# Patient Record
Sex: FEMALE | Race: WHITE | Employment: PART TIME | URBAN - NONMETROPOLITAN AREA
[De-identification: names, ages, dates, MRNs, and addresses within clinical notes are randomized per-mention and may not be internally consistent; named-entity substitution may affect disease eponyms.]

---
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
## 2024-04-10 ENCOUNTER — OFFICE VISIT (OUTPATIENT)
Dept: OBSTETRICS AND GYNECOLOGY | Age: 24
End: 2024-04-10
Payer: COMMERCIAL

## 2024-04-10 VITALS
SYSTOLIC BLOOD PRESSURE: 102 MMHG | BODY MASS INDEX: 25.44 KG/M2 | WEIGHT: 149 LBS | DIASTOLIC BLOOD PRESSURE: 70 MMHG | HEIGHT: 64 IN

## 2024-04-10 DIAGNOSIS — Z01.419 WELL WOMAN EXAM WITH ROUTINE GYNECOLOGICAL EXAM: Primary | ICD-10-CM

## 2024-04-10 DIAGNOSIS — Z31.9 PATIENT DESIRES PREGNANCY: ICD-10-CM

## 2024-04-10 DIAGNOSIS — Z12.4 ENCOUNTER FOR SCREENING FOR CERVICAL CANCER: ICD-10-CM

## 2024-04-10 NOTE — PROGRESS NOTES
"Subjective     Rambo Vang is a 23 y.o. female    History of Present Illness  The patient presents today as a new patient to our office. She was previously seen by Dr. Benites. The patient's last pap smear was completed on 3/30/2023 ASCUS HPV negative. She desires pregnancy and denies any GYN problems or concerns.   Gynecologic Exam  The patient's pertinent negatives include no genital itching, genital lesions, genital odor, genital rash, missed menses, pelvic pain, vaginal bleeding or vaginal discharge. The patient is experiencing no pain. Pertinent negatives include no abdominal pain, anorexia, back pain, chills, constipation, diarrhea, discolored urine, dysuria, fever, flank pain, frequency, headaches, hematuria, joint pain, joint swelling, nausea, painful intercourse, rash, sore throat, urgency or vomiting. She is sexually active. No, her partner does not have an STD. She uses nothing for contraception. Her menstrual history has been regular. The maximum temperature recorded prior to her arrival was no fever.         /70 (BP Location: Left arm, Patient Position: Sitting, Cuff Size: Adult)   Ht 162.6 cm (64\")   Wt 67.6 kg (149 lb)   LMP 04/08/2024   BMI 25.58 kg/m²     No outpatient encounter medications on file as of 4/10/2024.     No facility-administered encounter medications on file as of 4/10/2024.       Past Medical History  History reviewed. No pertinent past medical history.    Surgical History  History reviewed. No pertinent surgical history.    Family History  Family History   Problem Relation Age of Onset    Breast cancer Neg Hx     Ovarian cancer Neg Hx     Uterine cancer Neg Hx     Colon cancer Neg Hx     Melanoma Neg Hx        The following portions of the patient's history were reviewed and updated as appropriate: allergies, current medications, past family history, past medical history, past social history, past surgical history, and problem list.    Review of Systems "   Constitutional: Negative.  Negative for chills and fever.   HENT: Negative.  Negative for sore throat.    Eyes: Negative.    Respiratory: Negative.     Cardiovascular: Negative.    Gastrointestinal: Negative.  Negative for abdominal pain, anorexia, constipation, diarrhea, nausea and vomiting.   Endocrine: Negative.    Genitourinary: Negative.  Negative for dysuria, flank pain, frequency, hematuria, missed menses, pelvic pain, urgency and vaginal discharge.   Musculoskeletal: Negative.  Negative for back pain and joint pain.   Skin: Negative.  Negative for rash.   Allergic/Immunologic: Negative.    Neurological: Negative.    Hematological: Negative.    Psychiatric/Behavioral: Negative.         Objective   Physical Exam  Vitals and nursing note reviewed. Exam conducted with a chaperone present.   Constitutional:       General: She is not in acute distress.     Appearance: She is well-developed. She is not diaphoretic.   HENT:      Head: Normocephalic.      Right Ear: External ear normal.      Left Ear: External ear normal.      Nose: Nose normal.   Eyes:      General: No scleral icterus.        Right eye: No discharge.         Left eye: No discharge.      Conjunctiva/sclera: Conjunctivae normal.      Pupils: Pupils are equal, round, and reactive to light.   Neck:      Thyroid: No thyromegaly.      Vascular: No carotid bruit.      Trachea: No tracheal deviation.   Cardiovascular:      Rate and Rhythm: Normal rate and regular rhythm.      Pulses: Normal pulses.      Heart sounds: Normal heart sounds. No murmur heard.  Pulmonary:      Effort: Pulmonary effort is normal. No respiratory distress.      Breath sounds: Normal breath sounds. No wheezing.   Chest:   Breasts:     Breasts are symmetrical.      Right: Normal. No swelling, bleeding, inverted nipple, mass, nipple discharge, skin change or tenderness.      Left: Normal. No swelling, bleeding, inverted nipple, mass, nipple discharge, skin change or tenderness.    Abdominal:      General: Abdomen is flat. Bowel sounds are normal. There is no distension.      Palpations: Abdomen is soft. There is no mass.      Tenderness: There is no abdominal tenderness. There is no right CVA tenderness, left CVA tenderness or guarding.      Hernia: No hernia is present. There is no hernia in the left inguinal area or right inguinal area.   Genitourinary:     General: Normal vulva.      Exam position: Lithotomy position.      Labia:         Right: No rash, tenderness, lesion or injury.         Left: No rash, tenderness, lesion or injury.       Vagina: Normal. No signs of injury and foreign body. No vaginal discharge, erythema, tenderness or bleeding.      Cervix: Normal.      Uterus: Normal. Not enlarged, not fixed and not tender.       Adnexa: Right adnexa normal and left adnexa normal.        Right: No mass, tenderness or fullness.          Left: No mass, tenderness or fullness.        Rectum: Normal. No mass.      Comments: BSU normal  Urethral meatus  Normal  Perineum  Normal    Patient currently on her cycle at this time with moderate bleeding.  Musculoskeletal:         General: No tenderness. Normal range of motion.      Cervical back: Normal range of motion and neck supple.   Lymphadenopathy:      Head:      Right side of head: No submental, submandibular, tonsillar, preauricular, posterior auricular or occipital adenopathy.      Left side of head: No submental, submandibular, tonsillar, preauricular, posterior auricular or occipital adenopathy.      Cervical: No cervical adenopathy.      Right cervical: No superficial, deep or posterior cervical adenopathy.     Left cervical: No superficial, deep or posterior cervical adenopathy.      Upper Body:      Right upper body: No supraclavicular, axillary or pectoral adenopathy.      Left upper body: No supraclavicular, axillary or pectoral adenopathy.      Lower Body: No right inguinal adenopathy. No left inguinal adenopathy.   Skin:      General: Skin is warm and dry.      Findings: No bruising, erythema or rash.   Neurological:      Mental Status: She is alert and oriented to person, place, and time.      Coordination: Coordination normal.   Psychiatric:         Mood and Affect: Mood normal.         Behavior: Behavior normal.         Thought Content: Thought content normal.         Judgment: Judgment normal.       PHQ-9 Depression Screening  Little interest or pleasure in doing things? 0-->not at all   Feeling down, depressed, or hopeless? 0-->not at all   Trouble falling or staying asleep, or sleeping too much?     Feeling tired or having little energy?     Poor appetite or overeating?     Feeling bad about yourself - or that you are a failure or have let yourself or your family down?     Trouble concentrating on things, such as reading the newspaper or watching television?     Moving or speaking so slowly that other people could have noticed? Or the opposite - being so fidgety or restless that you have been moving around a lot more than usual?     Thoughts that you would be better off dead, or of hurting yourself in some way?     PHQ-9 Total Score 0   If you checked off any problems, how difficult have these problems made it for you to do your work, take care of things at home, or get along with other people?          Assessment & Plan   Diagnoses and all orders for this visit:    1. Well woman exam with routine gynecological exam (Primary)  Comments:  The patient is new to our office as a transfer of care from Dr. Wood. She is established with her pcp and lab work is current. Her last WWE was completed on 3/30/2023 with ASCUS pap. Her lmp was today.     2. Encounter for screening for cervical cancer  Comments:  The patient is sexually active and declines std screening today. Patient's last pap smear was completed on 3/30/2023 ASCUS, hpv negative. Pelvic exam with pap smear completed. We did discuss that due to her bleeding, there is a  change that her pap smear may need to be repeated. She v/u of this.   Orders:  -     Liquid-based Pap Smear, Screening    3. Patient desires pregnancy  Discussed with the patient to begin taking pnv and time intercourse with ovulation. She states she is already doing these things to improve chances for pregnancy.       Normal GYN exam. Encouraged SBE, pt is aware how to do self breast exam and the importance of same. Pap smear is done per ASCCP guidelines. HPV is not done. Lab work up is up to date.      BMI is >= 25 and <30. (Overweight) The following options were offered after discussion;: information on healthy weight added to patient's after visit summary       Mary Morris, RADHAMES  4/10/2024

## 2024-04-15 LAB
GEN CATEG CVX/VAG CYTO-IMP: NORMAL
LAB AP CASE REPORT: NORMAL
LAB AP GYN ADDITIONAL INFORMATION: NORMAL
LAB AP GYN OTHER FINDINGS: NORMAL
Lab: NORMAL
PATH INTERP SPEC-IMP: NORMAL
STAT OF ADQ CVX/VAG CYTO-IMP: NORMAL

## 2025-05-05 ENCOUNTER — OFFICE VISIT (OUTPATIENT)
Age: 25
End: 2025-05-05
Payer: COMMERCIAL

## 2025-05-05 VITALS
DIASTOLIC BLOOD PRESSURE: 81 MMHG | SYSTOLIC BLOOD PRESSURE: 117 MMHG | BODY MASS INDEX: 23.39 KG/M2 | WEIGHT: 137 LBS | HEIGHT: 64 IN

## 2025-05-05 DIAGNOSIS — Z30.9 ENCOUNTER FOR CONTRACEPTIVE MANAGEMENT, UNSPECIFIED TYPE: ICD-10-CM

## 2025-05-05 DIAGNOSIS — Z72.51 UNPROTECTED SEXUAL INTERCOURSE: ICD-10-CM

## 2025-05-05 DIAGNOSIS — Z01.419 WELL WOMAN EXAM WITH ROUTINE GYNECOLOGICAL EXAM: Primary | ICD-10-CM

## 2025-05-05 LAB
B-HCG UR QL: NEGATIVE
EXPIRATION DATE: NORMAL
INTERNAL NEGATIVE CONTROL: NEGATIVE
INTERNAL POSITIVE CONTROL: POSITIVE
Lab: NORMAL

## 2025-05-05 RX ORDER — DESOGESTREL AND ETHINYL ESTRADIOL 0.15-0.03
1 KIT ORAL DAILY
Qty: 90 TABLET | Refills: 0 | Status: SHIPPED | OUTPATIENT
Start: 2025-05-05 | End: 2026-05-05

## 2025-05-05 NOTE — PROGRESS NOTES
"Subjective     Rambo Vang is a 25 y.o. female    History of Present Illness  The patient presents to [facility] office today for completion of her WWE. She is not currently on any form of contraception. She did have recent unprotected intercourse during her ovulation timeframe. She is requesting upt and hcg quant for pregnancy screening. She is also interested in discussing contraception.  Gynecologic Exam  The patient's pertinent negatives include no genital itching, genital lesions, genital odor, genital rash, missed menses, pelvic pain, vaginal bleeding or vaginal discharge. The patient is experiencing no pain. Pertinent negatives include no abdominal pain, anorexia, back pain, chills, constipation, diarrhea, discolored urine, dysuria, fever, flank pain, frequency, headaches, hematuria, joint pain, joint swelling, nausea, painful intercourse, rash, sore throat, urgency or vomiting. She is sexually active. No, her partner does not have an STD. She uses nothing for contraception. Her menstrual history has been regular. The maximum temperature recorded prior to her arrival was no fever.         /81   Ht 162.6 cm (64\")   Wt 62.1 kg (137 lb)   LMP 04/16/2025 (Exact Date)   BMI 23.52 kg/m²     Outpatient Encounter Medications as of 5/5/2025   Medication Sig Dispense Refill    desogestrel-ethinyl estradiol (APRI) 0.15-30 MG-MCG per tablet Take 1 tablet by mouth Daily. 90 tablet 0     No facility-administered encounter medications on file as of 5/5/2025.       Past Medical History  History reviewed. No pertinent past medical history.    Surgical History  History reviewed. No pertinent surgical history.    Family History  Family History   Problem Relation Age of Onset    Breast cancer Neg Hx     Ovarian cancer Neg Hx     Uterine cancer Neg Hx     Colon cancer Neg Hx     Melanoma Neg Hx        The following portions of the patient's history were reviewed and updated as appropriate: allergies, current " medications, past family history, past medical history, past social history, past surgical history, and problem list.    Review of Systems   Constitutional: Negative.  Negative for chills and fever.   HENT: Negative.  Negative for sore throat.    Eyes: Negative.    Respiratory: Negative.     Cardiovascular: Negative.    Gastrointestinal: Negative.  Negative for abdominal pain, anorexia, constipation, diarrhea, nausea and vomiting.   Endocrine: Negative.    Genitourinary: Negative.  Negative for breast discharge, breast lump, breast pain, dysuria, flank pain, frequency, hematuria, missed menses, pelvic pain, urgency and vaginal discharge.   Musculoskeletal: Negative.  Negative for back pain and joint pain.   Skin: Negative.  Negative for rash.   Allergic/Immunologic: Negative.    Neurological: Negative.    Hematological: Negative.    Psychiatric/Behavioral: Negative.         Objective   Physical Exam  Vitals and nursing note reviewed. Exam conducted with a chaperone present.   Constitutional:       General: She is not in acute distress.     Appearance: She is well-developed. She is not diaphoretic.   HENT:      Head: Normocephalic.      Right Ear: External ear normal.      Left Ear: External ear normal.      Nose: Nose normal.   Eyes:      General: No scleral icterus.        Right eye: No discharge.         Left eye: No discharge.      Conjunctiva/sclera: Conjunctivae normal.      Pupils: Pupils are equal, round, and reactive to light.   Neck:      Thyroid: No thyromegaly.      Vascular: No carotid bruit.      Trachea: No tracheal deviation.   Cardiovascular:      Rate and Rhythm: Normal rate and regular rhythm.      Pulses: Normal pulses.      Heart sounds: Normal heart sounds. No murmur heard.  Pulmonary:      Effort: Pulmonary effort is normal. No respiratory distress.      Breath sounds: Normal breath sounds. No wheezing.   Chest:   Breasts:     Breasts are symmetrical.      Right: Normal. No swelling, bleeding,  inverted nipple, mass, nipple discharge, skin change or tenderness.      Left: Normal. No swelling, bleeding, inverted nipple, mass, nipple discharge, skin change or tenderness.   Abdominal:      General: Bowel sounds are normal. There is no distension.      Palpations: Abdomen is soft. There is no mass.      Tenderness: There is no abdominal tenderness. There is no right CVA tenderness, left CVA tenderness or guarding.      Hernia: No hernia is present. There is no hernia in the left inguinal area or right inguinal area.   Genitourinary:     General: Normal vulva.      Exam position: Lithotomy position.      Labia:         Right: No rash, tenderness, lesion or injury.         Left: No rash, tenderness, lesion or injury.       Vagina: Normal. No signs of injury and foreign body. No vaginal discharge, erythema, tenderness or bleeding.      Cervix: Normal.      Uterus: Normal. Not enlarged, not fixed and not tender.       Adnexa: Right adnexa normal and left adnexa normal.        Right: No mass, tenderness or fullness.          Left: No mass, tenderness or fullness.        Rectum: Normal. No mass.      Comments:   BSU normal  Urethral meatus  Normal  Perineum  Normal  Musculoskeletal:         General: No tenderness. Normal range of motion.      Cervical back: Normal range of motion and neck supple.   Lymphadenopathy:      Head:      Right side of head: No submental, submandibular, tonsillar, preauricular, posterior auricular or occipital adenopathy.      Left side of head: No submental, submandibular, tonsillar, preauricular, posterior auricular or occipital adenopathy.      Cervical: No cervical adenopathy.      Right cervical: No superficial, deep or posterior cervical adenopathy.     Left cervical: No superficial, deep or posterior cervical adenopathy.      Upper Body:      Right upper body: No supraclavicular, axillary or pectoral adenopathy.      Left upper body: No supraclavicular, axillary or pectoral  adenopathy.      Lower Body: No right inguinal adenopathy. No left inguinal adenopathy.   Skin:     General: Skin is warm and dry.      Findings: No bruising, erythema or rash.   Neurological:      Mental Status: She is alert and oriented to person, place, and time.      Coordination: Coordination normal.   Psychiatric:         Mood and Affect: Mood normal.         Behavior: Behavior normal.         Thought Content: Thought content normal.         Judgment: Judgment normal.       PHQ-9 Depression Screening  Little interest or pleasure in doing things? Not at all   Feeling down, depressed, or hopeless? Not at all   PHQ-2 Total Score 0   Trouble falling or staying asleep, or sleeping too much?     Feeling tired or having little energy?     Poor appetite or overeating?     Feeling bad about yourself - or that you are a failure or have let yourself or your family down?     Trouble concentrating on things, such as reading the newspaper or watching television?     Moving or speaking so slowly that other people could have noticed? Or the opposite - being so fidgety or restless that you have been moving around a lot more than usual?     Thoughts that you would be better off dead, or of hurting yourself in some way?     PHQ-9 Total Score     If you checked off any problems, how difficult have these problems made it for you to do your work, take care of things at home, or get along with other people?          Assessment & Plan   Diagnoses and all orders for this visit:    1. Well woman exam with routine gynecological exam (Primary)  Comments:  The patient is new to me and presents to the office for her WWE. She is established with her pcp and screening labs are current. LMP noted 4/16/2025.    2. Unprotected sexual intercourse  Comments:  Patient reports within the past couple of weeks she had unprotected intercourse during ovulation. LMP 4/16/2025. UPT negative in office today. She is requesting hcg quant. Patient will be  contacted with results.   Orders:  -     HCG, B-subunit, Quantitative ([facility] Only); Future    3. Encounter for contraceptive management, unspecified type  Comments:  Contraception options discussed. Negative UPT in office today. HCG quant pending. If negative, will start Apri and will follow up in 3 months.  Orders:  -     POC Pregnancy, Urine  -     desogestrel-ethinyl estradiol (APRI) 0.15-30 MG-MCG per tablet; Take 1 tablet by mouth Daily.  Dispense: 90 tablet; Refill: 0         Normal GYN exam. Encouraged SBE, pt is aware how to do self breast exam and the importance of same. Discussed weight management and importance of maintaining a healthy weight. Discussed Vitamin D intake and the importance of adequate vitamin D for both bone health and a healthy immune system.  Discussed daily exercise and the importance of same, in regards to a healthy heart as well as helping to maintain her weight and improving her mental health.  Colonoscopy is not indicated. Mammogram is not indicated. Bone density is not indicated. Pap smear is done per ASCCP guidelines. HPV is not done. Lab work up is up to date.      BMI is within normal parameters. No other follow-up for BMI required.      Mary Morris, RADHAMES  5/5/2025

## 2025-05-06 LAB — HCG INTACT+B SERPL-ACNC: <1 MIU/ML

## 2025-06-13 ENCOUNTER — INITIAL PRENATAL (OUTPATIENT)
Age: 25
End: 2025-06-13
Payer: COMMERCIAL

## 2025-06-13 VITALS — BODY MASS INDEX: 23.57 KG/M2 | WEIGHT: 137.3 LBS | SYSTOLIC BLOOD PRESSURE: 118 MMHG | DIASTOLIC BLOOD PRESSURE: 64 MMHG

## 2025-06-13 DIAGNOSIS — Z36.3 SCREENING, ANTENATAL, FOR MALFORMATION BY ULTRASOUND: ICD-10-CM

## 2025-06-13 DIAGNOSIS — O36.80X0 ENCOUNTER TO DETERMINE FETAL VIABILITY OF PREGNANCY, SINGLE OR UNSPECIFIED FETUS: ICD-10-CM

## 2025-06-13 DIAGNOSIS — R11.0 PREGNANCY RELATED NAUSEA, ANTEPARTUM: ICD-10-CM

## 2025-06-13 DIAGNOSIS — O26.899 PREGNANCY RELATED NAUSEA, ANTEPARTUM: ICD-10-CM

## 2025-06-13 DIAGNOSIS — Z3A.01 7 WEEKS GESTATION OF PREGNANCY: ICD-10-CM

## 2025-06-13 DIAGNOSIS — Z34.01 PRIMIGRAVIDA, FIRST TRIMESTER: Primary | ICD-10-CM

## 2025-06-13 RX ORDER — PRENATAL VIT NO.126/IRON/FOLIC 28MG-0.8MG
TABLET ORAL DAILY
COMMUNITY

## 2025-06-13 NOTE — PROGRESS NOTES
Arrived to initiate prenatal care     . Patient's last menstrual period was 2025 (exact date). Pre-pregnancy weight of 137 pounds.     History of Present Illness  The patient is a 25-year-old female who presents for an initial prenatal visit at 7 weeks gestation.    She reports experiencing nausea but has not yet sought any treatment for it. She has not experienced any episodes of vomiting. This is her first pregnancy, which was unplanned. She reports no vaginal bleeding or pelvic discomfort.        Prenatal history significant for: Primigravida     History significant for: adolescent scoliosis, NKA     Primary care provider: Ashkan Coe MD    The following portion of the patient's history were reviewed and updated as needed: allergies, current medications, past family history, past medical history, social history, surgical history, and problem list.    ROS: All systems reviewed and are negative with exception as noted above.       Ultrasound today: Intrauterine gestation with fetal heart rate of 142 bpm. Estimated gestational age 7w3d by CRL measurement 12.2 mm.      Cervical Cancer Screenin/10/2024 NILM    Exam:  /64   Wt 62.3 kg (137 lb 4.8 oz)   LMP 2025 (Exact Date)   BMI 23.57 kg/m²   Total weight gain: 0.136 kg (4.8 oz)    Prenatal Assessment  Fetal Heart Rate: 142  Movement: Absent    General Appearance:  healthy-appearing . Appropriate mood and behavior.  HEENT:  Neck supple, no thyroidmegaly.  Cardiorespiratory: HR str and reg. No murmur. Lungs clear. Resp even and unlabored.  Abd: Soft, nontender. No CVA tenderness.   Ext: Calves non-tender. No cyanosis or edema.    Postpartum Depression: Low Risk  (2025)    [place]  Depression Scale     Last EPDS Total Score: 0     Last EPDS Self Harm Result: Never         Diagnoses and all orders for this visit:    1. Primigravida, first trimester (Primary)  -     ToxASSURE Select 13 (MW) - Urine, Clean  Catch  -     Chlamydia trachomatis, Neisseria gonorrhoeae, Trichomonas vaginalis, PCR - Urine, Urine, Clean Catch  -     Urine Culture - Urine, Urine, Clean Catch  -     Varicella Zoster Antibody, IgG  -     Rubella Antibody, IgG  -     Hepatitis B Surface Antigen  -     RPR, Rfx Qn RPR / Confirm TP  -     HCV Antibody Rfx To Qnt PCR  -     HIV-1 / O / 2 Ag / Antibody  -     Hemoglobin A1c  -     TSH Rfx On Abnormal To Free T4  -     CBC & Differential  -     Antibody Screen  -     ABO / Rh  -     Ambulatory Referral to [facility]/Perinatology    2. 7 weeks gestation of pregnancy    3. Screening, , for malformation by ultrasound  -     Ambulatory Referral to [facility]/Perinatology    4. Encounter to determine fetal viability of pregnancy, single or unspecified fetus    5. Pregnancy related nausea, antepartum    Assessment & Plan  1. Initial prenatal visit at 7 weeks gestation.  The ultrasound today reviewed and shows a gestational age of approximately 7 weeks and 3 days, with a fetal heart rate of 142 beats per minute. Last menstrual period was on 2025. Pap smear is up-to-date and normal. Depression screening today considered low risk with score of 0.     - Encouraged daily self-care for physical, mental, and emotional well-being.   - Advised to consume stanley, Preggie Pops, or stanley tea to help alleviate nausea. Recommended over-the-counter Unisom and vitamin B6. Instructed to avoid having an empty stomach.  - Initial prenatal labs today.   - Referral to Maternal-Fetal Medicine ([facility]) made for an anatomy scan at 20 weeks.  - Encouraged to contact the office if experiencing any bleeding or pain.  - Discussed importance of dental health during pregnancy.   - Educated to remain active and exercise.  - Ob routine reviewed.   - Can send a message via [employer] or call the office if there are any questions or concerns.  - Dietary recommendations include avoiding unsafe seafood, undercooked meats, unpasteurized dairy  products, and soft cheeses.     Follow-up  Follow up in 4 weeks while deciding upon obstetrician for care also.    Prenatal folder provided and reviewed. Refer to AVS instructions for additional education provided.         This note has been signed electronically.     Petra Dang DNP, APRN, CNM      Patient or patient representative verbalized consent for the use of Ambient Listening during the visit with  RADHAMES Ross for chart documentation. 6/13/2025  13:48 CDT

## 2025-06-18 LAB
ABO GROUP BLD: NORMAL
BACTERIA UR CULT: NORMAL
BACTERIA UR CULT: NORMAL
BASOPHILS # BLD AUTO: 0.1 X10E3/UL (ref 0–0.2)
BASOPHILS NFR BLD AUTO: 1 %
BLD GP AB SCN SERPL QL: NEGATIVE
C TRACH RRNA SPEC QL NAA+PROBE: NEGATIVE
DRUGS UR: NORMAL
EOSINOPHIL # BLD AUTO: 0 X10E3/UL (ref 0–0.4)
EOSINOPHIL NFR BLD AUTO: 0 %
ERYTHROCYTE [DISTWIDTH] IN BLOOD BY AUTOMATED COUNT: 11.8 % (ref 11.7–15.4)
HBA1C MFR BLD: 5.1 % (ref 4.8–5.6)
HBV SURFACE AG SERPL QL IA: NEGATIVE
HCT VFR BLD AUTO: 40.5 % (ref 34–46.6)
HCV AB SERPL QL IA: NON REACTIVE
HCV AB SERPL QL IA: NORMAL
HGB BLD-MCNC: 13.1 G/DL (ref 11.1–15.9)
HIV 1+2 AB+HIV1 P24 AG SERPL QL IA: NON REACTIVE
IMM GRANULOCYTES # BLD AUTO: 0 X10E3/UL (ref 0–0.1)
IMM GRANULOCYTES NFR BLD AUTO: 0 %
LYMPHOCYTES # BLD AUTO: 1.8 X10E3/UL (ref 0.7–3.1)
LYMPHOCYTES NFR BLD AUTO: 14 %
MCH RBC QN AUTO: 30.2 PG (ref 26.6–33)
MCHC RBC AUTO-ENTMCNC: 32.3 G/DL (ref 31.5–35.7)
MCV RBC AUTO: 93 FL (ref 79–97)
MONOCYTES # BLD AUTO: 0.6 X10E3/UL (ref 0.1–0.9)
MONOCYTES NFR BLD AUTO: 5 %
N GONORRHOEA RRNA SPEC QL NAA+PROBE: NEGATIVE
NEUTROPHILS # BLD AUTO: 10 X10E3/UL (ref 1.4–7)
NEUTROPHILS NFR BLD AUTO: 80 %
OBSERVATION IMP: NORMAL
PLATELET # BLD AUTO: 233 X10E3/UL (ref 150–450)
RBC # BLD AUTO: 4.34 X10E6/UL (ref 3.77–5.28)
RH BLD: POSITIVE
RPR SER QL: NON REACTIVE
RUBV IGG SERPL IA-ACNC: 1.6 INDEX
T VAGINALIS RRNA SPEC QL NAA+PROBE: NEGATIVE
TSH SERPL DL<=0.005 MIU/L-ACNC: 0.47 UIU/ML (ref 0.45–4.5)
VZV IGG SER QL IA: REACTIVE
WBC # BLD AUTO: 12.5 X10E3/UL (ref 3.4–10.8)

## 2025-06-26 PROBLEM — O26.899 PREGNANCY RELATED NAUSEA, ANTEPARTUM: Status: ACTIVE | Noted: 2025-06-26

## 2025-06-26 PROBLEM — O26.899 PREGNANCY RELATED NAUSEA, ANTEPARTUM: Status: ACTIVE | Noted: 2025-06-13

## 2025-06-26 PROBLEM — R11.0 PREGNANCY RELATED NAUSEA, ANTEPARTUM: Status: ACTIVE | Noted: 2025-06-13

## 2025-06-26 PROBLEM — R11.0 PREGNANCY RELATED NAUSEA, ANTEPARTUM: Status: ACTIVE | Noted: 2025-06-26

## 2025-06-26 PROBLEM — Z34.00 PRENATAL CARE, FIRST PREGNANCY: Status: ACTIVE | Noted: 2025-06-13

## 2025-07-10 ENCOUNTER — TELEPHONE (OUTPATIENT)
Dept: OBSTETRICS AND GYNECOLOGY | Age: 25
End: 2025-07-10

## 2025-07-18 ENCOUNTER — ROUTINE PRENATAL (OUTPATIENT)
Age: 25
End: 2025-07-18
Payer: COMMERCIAL

## 2025-07-18 VITALS — DIASTOLIC BLOOD PRESSURE: 78 MMHG | SYSTOLIC BLOOD PRESSURE: 122 MMHG | BODY MASS INDEX: 23.09 KG/M2 | WEIGHT: 134.5 LBS

## 2025-07-18 DIAGNOSIS — Z31.430 ENCOUNTER OF FEMALE FOR TESTING FOR GENETIC DISEASE CARRIER STATUS FOR PROCREATIVE MANAGEMENT: ICD-10-CM

## 2025-07-18 DIAGNOSIS — Z3A.12 12 WEEKS GESTATION OF PREGNANCY: ICD-10-CM

## 2025-07-18 DIAGNOSIS — Z34.01 PRENATAL CARE, FIRST PREGNANCY IN FIRST TRIMESTER: Primary | ICD-10-CM

## 2025-07-18 DIAGNOSIS — Z36.0 ENCOUNTER FOR ANTENATAL SCREENING FOR CHROMOSOMAL ANOMALIES: ICD-10-CM

## 2025-07-18 LAB
GLUCOSE UR STRIP-MCNC: NEGATIVE MG/DL
PROT UR STRIP-MCNC: NEGATIVE MG/DL

## 2025-07-18 NOTE — PROGRESS NOTES
Reason for visit: Routine OB visit at 12w3d      History of Present Illness  The patient is a 25-year-old female who presents for a routine prenatal visit at 12 weeks gestation.    She reports feeling well overall and has no specific concerns or discomforts. She has not yet chosen an obstetrician for her pregnancy. Neither the patient or her  has any known family history of birth defects or genetic disorders.        ROS: All systems reviewed and are negative with exception as noted above.       /78   Wt 61 kg (134 lb 8 oz)   LMP 2025 (Exact Date)   BMI 23.09 kg/m²   Total weight gain: -1.134 kg (-2 lb 8 oz)  Total weight gain expected 11.5 kg (25 lb)-16 kg (35 lb)    Prenatal Assessment  Fetal Heart Rate: 147  Movement: Absent        Exam:  General Appearance:  No visualized signs of distress. Normal mood and behavior.  Cardiorespiratory: HR str and reg. Lungs clear. Breathing even and unlabored.  Abdomen: soft and nontender. No CVA tenderness. Gravid uterus.  Extremeties: Calves non-tender.  Edema  LLE Edema: None  RLE Edema: None  Facial Edema: None       Postpartum Depression: Low Risk  (2025)    [place]  Depression Scale     Last EPDS Total Score: 0     Last EPDS Self Harm Result: Never           Impression  Diagnoses and all orders for this visit:    1. Prenatal care, first pregnancy in first trimester (Primary)    2. 12 weeks gestation of pregnancy  -     POC Urinalysis Dipstick  -     [facility] Horizon 14 (Pan-Ethnic Standard) - Blood,  -     [facility] Panorama Prenatal Test: Chromosomes 13, 18, 21, X & Y: Triploidy 22Q.11.2 Deletion - Blood,    3. Encounter of female for testing for genetic disease carrier status for procreative management  -     [facility] Horizon 14 (Pan-Ethnic Standard) - Blood,    4. Encounter for  screening for chromosomal anomalies  -     [facility] Panorama Prenatal Test: Chromosomes 13, 18, 21, X & Y: Triploidy 22Q.11.2 Deletion - Blood,           Assessment & Plan  1. Routine prenatal visit at 12 weeks gestation.  - Prenatal labs reviewed and are within normal limits, indicating no areas of concern.  - Blood pressure is well-regulated at 122/78, and the baby's heart rate is stable at 147.   - Negative urine for both glucose and protein.   - Education on second trimester pregnancy and round ligament pain provided.   - She was instructed to report any instances of vaginal bleeding or severe pelvic pain.   - For general inquiries, she can use [facility] [facility], but for urgent matters, she should phone the office to be able to speak with on-call provider.  - She has been advised to maintain adequate hydration, particularly during the upcoming hot weekend.  - The importance of prenatal vitamins was emphasized, and she was encouraged to continue their intake.   - If she experiences nausea with prenatal vitamins, she can consider taking folic acid or methyl folate instead.  - Discussed and she has opted for both maternal carrier screening and fetal chromosomal risk screening today. To be drawn with patient consent.   - An anatomy scan is scheduled for 09/09/2025 through the maternal fetal medicine office on the third floor.    Follow-up: She will follow up with Dr. Sequeira in 4 weeks. Also would like to meet Dr. Sims at a future visit.     Refer to the AVS instructions for additional education provided.         This note has been signed electronically.    Petra Dang, CRISTOPHER, APRN, CNM    Patient or patient representative verbalized consent for the use of Ambient Listening during the visit with  RADHAMES Ross for chart documentation. 7/18/2025  15:12 CDT

## 2025-07-28 LAB
Lab: NEGATIVE
Lab: NORMAL
NTRA ALPHA-THALASSEMIA: NEGATIVE
NTRA BETA-HEMOGLOBINOPATHIES: NEGATIVE
NTRA CANAVAN DISEASE: NEGATIVE
NTRA CYSTIC FIBROSIS: NEGATIVE
NTRA DUCHENNE/BECKER MUSCULAR DYSTROPHY: NEGATIVE
NTRA FAMILIAL DYSAUTONOMIA: NEGATIVE
NTRA FRAGILE X SYNDROME: NEGATIVE
NTRA GALACTOSEMIA: NEGATIVE
NTRA GAUCHER DISEASE: NEGATIVE
NTRA MEDIUM CHAIN ACYL-COA DEHYDROGENASE DEFICIENCY: NEGATIVE
NTRA POLYCYSTIC KIDNEY DISEASE, AUTOSOMAL RECESSIVE: NEGATIVE
NTRA SMITH-LEMLI-OPITZ SYNDROME: NEGATIVE
NTRA SPINAL MUSCULAR ATROPHY: NEGATIVE
NTRA TAY-SACHS DISEASE: NEGATIVE

## 2025-08-20 ENCOUNTER — ROUTINE PRENATAL (OUTPATIENT)
Age: 25
End: 2025-08-20
Payer: COMMERCIAL

## 2025-08-20 VITALS — WEIGHT: 134.6 LBS | BODY MASS INDEX: 23.1 KG/M2 | DIASTOLIC BLOOD PRESSURE: 70 MMHG | SYSTOLIC BLOOD PRESSURE: 114 MMHG

## 2025-08-20 DIAGNOSIS — Z3A.17 17 WEEKS GESTATION OF PREGNANCY: Primary | ICD-10-CM
